# Patient Record
Sex: MALE | Race: WHITE | URBAN - METROPOLITAN AREA
[De-identification: names, ages, dates, MRNs, and addresses within clinical notes are randomized per-mention and may not be internally consistent; named-entity substitution may affect disease eponyms.]

---

## 2019-05-14 ENCOUNTER — OFFICE VISIT (OUTPATIENT)
Dept: FAMILY MEDICINE | Facility: CLINIC | Age: 24
End: 2019-05-14
Payer: COMMERCIAL

## 2019-05-14 VITALS
OXYGEN SATURATION: 98 % | TEMPERATURE: 97.1 F | BODY MASS INDEX: 23.19 KG/M2 | HEART RATE: 54 BPM | DIASTOLIC BLOOD PRESSURE: 64 MMHG | RESPIRATION RATE: 16 BRPM | WEIGHT: 175 LBS | SYSTOLIC BLOOD PRESSURE: 115 MMHG | HEIGHT: 73 IN

## 2019-05-14 DIAGNOSIS — F90.2 ADHD (ATTENTION DEFICIT HYPERACTIVITY DISORDER), COMBINED TYPE: ICD-10-CM

## 2019-05-14 DIAGNOSIS — H61.22 IMPACTED CERUMEN OF LEFT EAR: Primary | ICD-10-CM

## 2019-05-14 PROCEDURE — 99203 OFFICE O/P NEW LOW 30 MIN: CPT | Mod: 25 | Performed by: NURSE PRACTITIONER

## 2019-05-14 PROCEDURE — 69209 REMOVE IMPACTED EAR WAX UNI: CPT | Mod: LT | Performed by: NURSE PRACTITIONER

## 2019-05-14 RX ORDER — DEXTROAMPHETAMINE SACCHARATE, AMPHETAMINE ASPARTATE, DEXTROAMPHETAMINE SULFATE AND AMPHETAMINE SULFATE 3.75; 3.75; 3.75; 3.75 MG/1; MG/1; MG/1; MG/1
15 TABLET ORAL 2 TIMES DAILY
Refills: 0 | COMMUNITY
Start: 2019-05-14

## 2019-05-14 RX ORDER — DEXTROAMPHETAMINE SACCHARATE, AMPHETAMINE ASPARTATE, DEXTROAMPHETAMINE SULFATE AND AMPHETAMINE SULFATE 1.25; 1.25; 1.25; 1.25 MG/1; MG/1; MG/1; MG/1
5 TABLET ORAL 2 TIMES DAILY
Refills: 0 | COMMUNITY
Start: 2019-05-14

## 2019-05-14 ASSESSMENT — MIFFLIN-ST. JEOR: SCORE: 1823.7

## 2019-05-14 NOTE — NURSING NOTE
Patient identified using two patient identifiers.  Ear exam showing wax occlusion completed by provider.  Solution: warm water was placed in the left ear(s) via irrigation tool: elephant liliya Sy MA on 5/14/2019 at 8:36 AM

## 2019-05-14 NOTE — PROGRESS NOTES
SUBJECTIVE:   Kvng Patino is a 24 year old male who presents to clinic today for the following   health issues:    Chief Complaint   Patient presents with     Ear Problem     sore for 2 weeks on left ear and cant hear     Kvng is in the clinic with complaints that his left ear feels clogged, congested, and he can't hear.  Symptoms started 2 weeks ago with a mild uri, congestion, etc.  His right ear feels fine.  He does have a history of cerumen impactions.  Today he feels like he has excessive earwax  /Impaction.  And if he does, he is requesting an ear wash.    ADHD:  He has a long history of ADHD that is been managed by [facility].  Yesterday he went to [facility] to fill his prescriptions, and he was told by [facility] that they have to limit his supply of Adderall and that they decreased his dosing.  He does not understand why this happened.  He has been on medications for several years.  They worked very well.      Additional history: as documented    Reviewed  and updated as needed this visit by clinical staff  Tobacco  Allergies  Meds  Med Hx  Surg Hx  Fam Hx  Soc Hx        Reviewed and updated as needed this visit by Provider         Patient Active Problem List   Diagnosis     ADHD (attention deficit hyperactivity disorder), combined type     Past Surgical History:   Procedure Laterality Date     ENT SURGERY      T&A     GENITOURINARY SURGERY      CIRCUMCISION,NON-     ORTHOPEDIC SURGERY      WRIST       Social History     Tobacco Use     Smoking status: Current Some Day Smoker     Years: 4.00     Last attempt to quit: 2015     Years since quitting: 3.6     Smokeless tobacco: Never Used     Tobacco comment: A couple cigarettes at a time here and there.    Substance Use Topics     Alcohol use: Yes     Alcohol/week: 3.0 oz     Types: 5 Cans of beer per week     Family History   Problem Relation Age of Onset     Depression Maternal Grandmother      Anxiety Disorder Maternal  "Grandmother      Substance Abuse Maternal Grandfather      Substance Abuse Paternal Grandfather      Depression Paternal Grandfather          Current Outpatient Medications   Medication Sig Dispense Refill     amphetamine-dextroamphetamine (ADDERALL) 15 MG tablet Take 1 tablet (15 mg) by mouth 2 times daily . Managed by [facility].  0     amphetamine-dextroamphetamine (ADDERALL) 5 MG tablet Take 1 tablet (5 mg) by mouth 2 times daily . Managed by [facility].  0     IBUPROFEN PO        MELATONIN PO Take by mouth nightly as needed       No Known Allergies  No lab results found.   BP Readings from Last 3 Encounters:   05/14/19 115/64   02/21/16 138/63   12/22/15 155/82    Wt Readings from Last 3 Encounters:   05/14/19 79.4 kg (175 lb)   02/21/16 79.4 kg (175 lb)   12/22/15 84.8 kg (187 lb)            Labs reviewed in EPIC    ROS:  Constitutional, HEENT, cardiovascular, pulmonary, GI, , musculoskeletal, neuro, skin, endocrine and psych systems are negative, except as otherwise noted.    OBJECTIVE:     /64 (BP Location: Left arm, Patient Position: Sitting, Cuff Size: Adult Regular)   Pulse 54   Temp 97.1  F (36.2  C) (Oral)   Resp 16   Ht 1.848 m (6' 0.75\")   Wt 79.4 kg (175 lb)   SpO2 98%   BMI 23.25 kg/m    Body mass index is 23.25 kg/m .  GENERAL: healthy, alert and no distress  EYES: Eyes grossly normal to inspection, PERRL and conjunctivae and sclerae normal  HENT: right ear canal and TM normal, nose and mouth without ulcers or lesions.  Left EAC is blocked with the cerumen impaction.  After MA left EAC irrigation, the cerumen impaction was cleared and the TM is normal.  NECK: no adenopathy and thyroid normal to palpation  RESP: lungs clear to auscultation - no rales, rhonchi or wheezes  CV: regular rate and rhythm, normal S1 S2, no S3 or S4, no murmur, click or rub, no peripheral edema and peripheral pulses strong  MS: no gross musculoskeletal defects noted, no edema. Ambulatory with a " steady gait.   SKIN: warm and dry  NEURO: Normal strength and tone, mentation intact and speech normal  PSYCH: mentation appears normal, affect normal/bright    ASSESSMENT/PLAN:     (H61.22) Impacted cerumen of left ear  (primary encounter diagnosis)  Comment:   Plan: I did talk to him about home care for earwax, follow-up in clinic as needed, [facility] for home ear wash etc.  He was thankful today and stated his hearing did return to normal after the cerumen was removed.  He will follow-up PRN.    (F90.2) ADHD (attention deficit hyperactivity disorder), combined type  Comment: History of/well-controlled on Adderall  Plan: I had a discussion with the patient about controlled substances, Adderall, prescription plans, physician appointments.  I told this patient today that I am not totally clear as to why he was told his tablets supply was to be limited or that a dose adjustment would have to be done on his medications without physician consent.  He has been well controlled on his 15 mg Exar once a day and 5 mg IR's twice daily as needed.  I referred him to call his insurance plan to see what their responses about his prescription coverage on these medications and then he is to deal with his prescribing provider and [facility] for clarification regarding this issue.  Otherwise he is to return to the clinic to see me/Edith to establish care for Adderall and ADHD management.    This note was created using the [employer] Dict[employer] Software and therefore should be read with the understanding of the potential for subtle errors in transcription.          VARUN Hinojosa [facility]

## 2019-05-14 NOTE — PATIENT INSTRUCTIONS
Patient Education       Earwax, Home Treatment    Everyone produces earwax from the lining of the ear canal. It serves to lubricate and protect the ear. The wax that forms in the canal naturally moves toward the outside of the ear and falls out. Sometimes the ear canal may contain too much wax. This can cause a blockage and loss of hearing. Directions are given below for home treatment.  Home care  If your doctor has advised you to remove a wax blockage yourself, follow these directions:    Unless a medicine was prescribed, you may use an over-the-counter product made for clearing earwax. These contain carbamide peroxide. Lie down with the blocked ear facing upward. Apply one dropper full of medicine and wait a few minutes. Grasp the outer ear and wiggle it to help the solution enter the canal.    Lean over a sink or basin with the blocked ear facing downward. Use a bulb syringe filled with warm (not hot or cold) water to rinse the ear several times. Use gentle pressure only.    If you are having trouble draining the water out of your ear canal, put a few drops of rubbing alcohol (isopropyl alcohol) into the ear canal. This will help remove the remaining water.    Repeat this procedure once a day for up to three days, or until your hearing is back to normal. Do not use this treatment for more than three days in a row.  Don ts    Don t use cold water to rinse the ear. This will make you dizzy.    Don t perform this procedure if you have an ear infection.    Don t perform this procedure if you have a ruptured eardrum.    Don t use cotton swabs, matches, hairpins, keys, or other objects to  clean  the ear canal. This can cause infection of the ear canal or rupture the eardrum. Because of their size and shape, cotton swabs can push earwax deeper into the ear canal instead of removing it.  Follow-up care  Follow up with your health care provider if you are not improving after three cleaning attempts, or as advised.  When  to seek medical advice  Call your health care provider right away if any of these occur:    Worsening ear pain    Fever of 101 F (38.3 C) or higher, or as directed by your health care provider    Hearing does not return to normal after three days of treatment    Fluid drainage or bleeding from the ear canal    Swelling, redness, or tenderness of the outer ear    Headache, neck pain, or stiff neck    [phone] [employer]. [address] [place] [address], [place] [address]. All rights reserved. This information is not intended as a substitute for professional medical care. Always follow your healthcare professional's instructions.